# Patient Record
(demographics unavailable — no encounter records)

---

## 2025-06-24 NOTE — END OF VISIT
[FreeTextEntry3] :  I, Dr. Guzman, personally performed the evaluation and management (E/M) services for this new patient. That E/M includes conducting the clinically appropriate initial history &/or exam, assessing all conditions, and establishing the plan of care. Today, my PAVAN, was here to observe my evaluation and management service for this patient & follow plan of care established by me going forward.

## 2025-06-24 NOTE — HISTORY OF PRESENT ILLNESS
[FreeTextEntry1] : 56yoF average risk for CRC. No significant pmhx. Presents today for CRC screening   Pt feels well. Complaining of coughing while eating. Denies choking or dysphagia.  States it is becoming more frequent. Patient denies any abdominal pain, nausea, vomiting, dysphagia, heart burn, unintentional weight loss, diarrhea, constipation, melena, hematochezia.     last colonoscopy was about 11 years ago with Dr. Enriquez.

## 2025-06-24 NOTE — ASSESSMENT
[FreeTextEntry1] : 56yoF average risk for CRC. No significant pmhx. Presents today for CRC screening   Coughing  -Will schedule EGD for silent reflux, esophagitis, gastritis.  -Risks vs benefits discussed   CRC Screening -Will schedule colonoscopy -Risks vs. benefits discussed -Educated on generic suprep and dulcolax Prep     I have spent 50 minutes of time on the encounter which excludes teaching time and/or separately reported services.

## 2025-06-24 NOTE — PHYSICAL EXAM
